# Patient Record
Sex: FEMALE | URBAN - METROPOLITAN AREA
[De-identification: names, ages, dates, MRNs, and addresses within clinical notes are randomized per-mention and may not be internally consistent; named-entity substitution may affect disease eponyms.]

---

## 2023-04-06 ENCOUNTER — OFFICE VISIT (OUTPATIENT)
Facility: LOCATION | Age: 83
End: 2023-04-06
Payer: MEDICARE

## 2023-04-06 DIAGNOSIS — H35.372 PUCKERING OF MACULA, LEFT EYE: ICD-10-CM

## 2023-04-06 DIAGNOSIS — H04.123 DRY EYE SYNDROME OF BILATERAL LACRIMAL GLANDS: ICD-10-CM

## 2023-04-06 DIAGNOSIS — H43.813 VITREOUS DEGENERATION, BILATERAL: ICD-10-CM

## 2023-04-06 DIAGNOSIS — H40.1131 PRIMARY OPEN-ANGLE GLAUCOMA BILATERAL MILD STAGE: Primary | ICD-10-CM

## 2023-04-06 DIAGNOSIS — E11.9 TYPE 2 DIABETES MELLITUS WITHOUT COMPLICATIONS: ICD-10-CM

## 2023-04-06 PROCEDURE — 99215 OFFICE O/P EST HI 40 MIN: CPT | Performed by: OPHTHALMOLOGY

## 2023-04-06 PROCEDURE — 99214 OFFICE O/P EST MOD 30 MIN: CPT | Performed by: OPHTHALMOLOGY

## 2023-04-06 ASSESSMENT — INTRAOCULAR PRESSURE
OD: 15
OS: 13

## 2023-04-06 NOTE — IMPRESSION/PLAN
Impression: ERM OS

OCT/MAC 3/2022: Mild ERM OS, stable from pre-op. Plan: Patient advised of condition and to monitor signs and symptoms of changes.

## 2023-04-06 NOTE — IMPRESSION/PLAN
Impression: Examination revealed dry eye syndrome secondary to tear deficiencies. Trace SPK OD, 2+ SPK OS. Patient is asymptomatic. Plan: Continue ATs TID OU.

## 2023-04-06 NOTE — IMPRESSION/PLAN
Impression: 6 month f/u with IOP check and DFE for POAG OU. POHx: POAG mild OU, s/p Complex CEIOL/Hydrus/Dextenza OU, ERM OS, PVD OU, PCO OU. FOHx: (+) for blindness and vision impairment. PMHx: DMII, breast cancer, Arthritis, HLD. Eye medications: Refresh TID OU. Pre-op Lumigan QHS OU. TMAX: High teens OU. Target IOP: < 18 OU. Plan: Testing:
OCT/ONH 9/2022: OD thin IT>S/N, OS thin I/S. OU no definite progression. HVF 24-2 5/2021: OU temporal depression, OS IAS. OU stable. Pachys: 540/535. Gonio 09/2022: SS 1+ 360, N hydrus in good position, PAS near inlet. Today:
IOP acceptable OU. Informed patient of stable examination. Plan:
Monitor off gtts. RTC in 1 year with OCT Preet Linares 72 OU, DFE OU, and gonio.

## 2023-04-06 NOTE — IMPRESSION/PLAN
Impression: Patient has type II diabetes with no complications. Diabetic damage at today's visit. Patient does not check BSL regularly. Plan: Patient advised to continue care with primary care physician. Discussed with patient the importance of controlling Blood Sugar and A1c levels.

## 2023-04-06 NOTE — IMPRESSION/PLAN
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 She is c/o floaters in both eyes. Explained that is is very common to see/notice her chronic floaters more after cataract surgery due to clear implant. Given patient's concern will refer the patient to retina for dilated eye exam to r/o vitreoretinal adhesions and retinal pathology. Plan: Refer to retina.

## 2024-05-09 ENCOUNTER — OFFICE VISIT (OUTPATIENT)
Facility: LOCATION | Age: 84
End: 2024-05-09
Payer: MEDICARE

## 2024-05-09 DIAGNOSIS — H40.1131 PRIMARY OPEN-ANGLE GLAUCOMA BILATERAL MILD STAGE: Primary | ICD-10-CM

## 2024-05-09 DIAGNOSIS — H04.123 DRY EYE SYNDROME OF BILATERAL LACRIMAL GLANDS: ICD-10-CM

## 2024-05-09 PROCEDURE — 92133 CPTRZD OPH DX IMG PST SGM ON: CPT | Performed by: OPHTHALMOLOGY

## 2024-05-09 PROCEDURE — 99214 OFFICE O/P EST MOD 30 MIN: CPT | Performed by: OPHTHALMOLOGY

## 2024-05-09 ASSESSMENT — INTRAOCULAR PRESSURE
OD: 12
OS: 12

## 2025-05-09 ENCOUNTER — OFFICE VISIT (OUTPATIENT)
Facility: LOCATION | Age: 85
End: 2025-05-09
Payer: MEDICARE

## 2025-05-09 DIAGNOSIS — E11.9 TYPE 2 DIABETES MELLITUS WITHOUT COMPLICATIONS: ICD-10-CM

## 2025-05-09 DIAGNOSIS — H04.123 DRY EYE SYNDROME OF BILATERAL LACRIMAL GLANDS: ICD-10-CM

## 2025-05-09 DIAGNOSIS — H40.1131 PRIMARY OPEN-ANGLE GLAUCOMA BILATERAL MILD STAGE: Primary | ICD-10-CM

## 2025-05-09 PROCEDURE — 92133 CPTRZD OPH DX IMG PST SGM ON: CPT | Performed by: OPHTHALMOLOGY

## 2025-05-09 PROCEDURE — 99214 OFFICE O/P EST MOD 30 MIN: CPT | Performed by: OPHTHALMOLOGY

## 2025-05-09 PROCEDURE — 92020 GONIOSCOPY: CPT | Performed by: OPHTHALMOLOGY

## 2025-05-09 ASSESSMENT — VISUAL ACUITY
OS: 20/20
OD: 20/25

## 2025-05-09 ASSESSMENT — INTRAOCULAR PRESSURE
OS: 11
OD: 10